# Patient Record
Sex: FEMALE | Race: WHITE | NOT HISPANIC OR LATINO | URBAN - METROPOLITAN AREA
[De-identification: names, ages, dates, MRNs, and addresses within clinical notes are randomized per-mention and may not be internally consistent; named-entity substitution may affect disease eponyms.]

---

## 2023-11-08 ENCOUNTER — APPOINTMENT (OUTPATIENT)
Age: 78
Setting detail: DERMATOLOGY
End: 2023-11-10

## 2023-11-08 VITALS
DIASTOLIC BLOOD PRESSURE: 108 MMHG | WEIGHT: 172 LBS | TEMPERATURE: 98.3 F | RESPIRATION RATE: 18 BRPM | HEART RATE: 72 BPM | HEIGHT: 60 IN | SYSTOLIC BLOOD PRESSURE: 218 MMHG

## 2023-11-08 VITALS — HEART RATE: 76 BPM | SYSTOLIC BLOOD PRESSURE: 212 MMHG | DIASTOLIC BLOOD PRESSURE: 100 MMHG | RESPIRATION RATE: 18 BRPM

## 2023-11-08 DIAGNOSIS — L82.1 OTHER SEBORRHEIC KERATOSIS: ICD-10-CM

## 2023-11-08 DIAGNOSIS — Z12.83 ENCOUNTER FOR SCREENING FOR MALIGNANT NEOPLASM OF SKIN: ICD-10-CM

## 2023-11-08 DIAGNOSIS — D18.0 HEMANGIOMA: ICD-10-CM

## 2023-11-08 DIAGNOSIS — L81.4 OTHER MELANIN HYPERPIGMENTATION: ICD-10-CM

## 2023-11-08 DIAGNOSIS — L57.8 OTHER SKIN CHANGES DUE TO CHRONIC EXPOSURE TO NONIONIZING RADIATION: ICD-10-CM

## 2023-11-08 PROBLEM — D03.0 MELANOMA IN SITU OF LIP: Status: ACTIVE | Noted: 2023-11-08

## 2023-11-08 PROBLEM — D18.01 HEMANGIOMA OF SKIN AND SUBCUTANEOUS TISSUE: Status: ACTIVE | Noted: 2023-11-08

## 2023-11-08 PROCEDURE — OTHER DECISION TO PERFORM MAJOR SURGERY: OTHER

## 2023-11-08 PROCEDURE — 99204 OFFICE O/P NEW MOD 45 MIN: CPT | Mod: 57

## 2023-11-08 PROCEDURE — OTHER COUNSELING: OTHER

## 2023-11-08 PROCEDURE — OTHER ADDITIONAL NOTES: OTHER

## 2023-11-08 PROCEDURE — OTHER SEPARATE AND IDENTIFIABLE DOCUMENTATION: OTHER

## 2023-11-08 PROCEDURE — OTHER MOHS SURGERY: OTHER

## 2023-11-08 PROCEDURE — 17311 MOHS 1 STAGE H/N/HF/G: CPT

## 2023-11-08 PROCEDURE — 14061 TIS TRNFR E/N/E/L10.1-30SQCM: CPT

## 2023-11-08 PROCEDURE — OTHER SURGICAL DECISION MAKING: OTHER

## 2023-11-08 PROCEDURE — OTHER ADDITIONAL PATHOLOGY SPECIMEN: OTHER

## 2023-11-08 PROCEDURE — OTHER ASC CONSULTATION: OTHER

## 2023-11-08 PROCEDURE — OTHER MIPS QUALITY: OTHER

## 2023-11-08 PROCEDURE — OTHER MELANOMA STAGING: OTHER

## 2023-11-08 PROCEDURE — 88342 IMHCHEM/IMCYTCHM 1ST ANTB: CPT | Mod: 59

## 2023-11-08 PROCEDURE — OTHER MIPS CLAIM BASED REPORTING: OTHER

## 2023-11-08 PROCEDURE — 7010F PT INFO INTO RECALL SYSTEM: CPT

## 2023-11-08 ASSESSMENT — LOCATION DETAILED DESCRIPTION DERM
LOCATION DETAILED: SUPERIOR THORACIC SPINE
LOCATION DETAILED: LEFT LOWER CUTANEOUS LIP
LOCATION DETAILED: RIGHT POSTERIOR SHOULDER
LOCATION DETAILED: EPIGASTRIC SKIN
LOCATION DETAILED: STERNAL NOTCH
LOCATION DETAILED: LEFT POSTERIOR SHOULDER

## 2023-11-08 ASSESSMENT — LOCATION SIMPLE DESCRIPTION DERM
LOCATION SIMPLE: RIGHT SHOULDER
LOCATION SIMPLE: UPPER BACK
LOCATION SIMPLE: CHEST
LOCATION SIMPLE: LEFT LIP
LOCATION SIMPLE: LEFT SHOULDER
LOCATION SIMPLE: ABDOMEN

## 2023-11-08 ASSESSMENT — LOCATION ZONE DERM
LOCATION ZONE: LIP
LOCATION ZONE: ARM
LOCATION ZONE: TRUNK

## 2023-11-08 NOTE — PROCEDURE: MOHS SURGERY
Length To Time In Minutes Device Was In Place: 10 Xeljanz Counseling: I discussed with the patient the risks of Xeljanz therapy including increased risk of infection, liver issues, headache, diarrhea, or cold symptoms. Live vaccines should be avoided. They were instructed to call if they have any problems.

## 2023-11-08 NOTE — PROCEDURE: MIPS QUALITY
Additional Notes: Patient was entered into an external recall system that includes a target date for the next full skin exam and a process to follow up with the patient if they do not follow up within the specified timeframe or if they miss there scheduled appointment.
Quality 130: Documentation Of Current Medications In The Medical Record: Current Medications Documented
Quality 397: Melanoma: Reporting: Pathology report includes the pT Category, thickness, ulceration and mitotic rate, peripheral and deep margin status and presence or absence of microsatellitosis for invasive tumors.
Detail Level: Detailed
Quality 138: Melanoma: Coordination Of Care: A treatment plan was communicated to the physicians providing continuing care within one month of diagnosis outlining: diagnosis, tumor thickness and a plan for surgery or alternate care.
Quality 226: Preventive Care And Screening: Tobacco Use: Screening And Cessation Intervention: Patient screened for tobacco use and is an ex/non-smoker
Quality 137: Melanoma: Continuity Of Care - Recall System: Documentation of system reason(s) for not entering patient's information into a recall system (eg, melanoma being monitored by another physician provider)

## 2023-11-08 NOTE — PROCEDURE: MOHS SURGERY
Medication(s) Requested: Ritalin 10mg #60  Last office visit: 10/10/18  Next office visit: 2/7/19  Last refill: 11/29/18  Is the patient due for refill of this medication(s): Yes  PDMP review: Criteria met. Forwarded to Physician/ORE[facility] for signature.      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

## 2023-11-08 NOTE — PROCEDURE: COUNSELING
Show Follow-Up Variable: Yes
Quality 138: Melanoma: Coordination Of Care: A treatment plan was communicated to the physicians providing continuing care within one month of diagnosis outlining: diagnosis, tumor thickness and a plan for surgery or alternate care.
When Should The Patient Follow-Up For Their Next Full-Body Skin Exam?: 1 Year
Quality 137: Melanoma: Continuity Of Care - Recall System: Documentation of system reason(s) for not entering patient's information into a recall system (eg, melanoma being monitored by another physician provider)
Detail Level: Detailed
Sunscreen Recommendations: Prescribed broad spectrum sunscreen spf 30+
Detail Level: Generalized

## 2023-11-08 NOTE — PROCEDURE: ADDITIONAL PATHOLOGY SPECIMEN
Body Location Override (Optional - Billing Will Still Be Based On Selected Body Map Location If Applicable): Left lower lip

## 2023-11-08 NOTE — PROCEDURE: DECISION TO PERFORM MAJOR SURGERY
Major Surgery (90 Day Global Surgeries Only) To Be Peformed Today Or Tomorrow: Mohs Surgery with Adjacent Tissue Transfer Repair
Usage Warning: This plan is only intended for use with surgical procedures which generate a 90 day global period.  These procedures are listed below.  Use of this plan for other procedures (Mohs surgery or excision with a different repair than listed below for example) is inappropriate and will increase your risk of failing an audit.
Detail Level: Simple
Date Of Surgery: today
Reason For Emergent Surgery: After examining the patient and evaluating the history and clinical presentation, the decision was made to perform major surgery.

## 2023-11-08 NOTE — PROCEDURE: MIPS CLAIM BASED REPORTING
Smoking Cessation Modifiers: Please select an option if indicated
Detail Level: Detailed
Recall: Patient information entered into a recall system that includes: target date for the next exam specified and a process to follow up with patients regarding missed or unscheduled appointments

## 2023-11-08 NOTE — PROCEDURE: MOHS SURGERY
Spoke with patient's wife (Selma)  regarding time of arrival for procedure that is scheduled on 5/23/17. Patient's wife verbalized instructions and confirmed procedure date and time of arrival on 5/23/17 at 7 AM.    Scc In Situ Histology Text: There was squamous cell atypia and proliferation in a SCIS pattern.

## 2023-11-08 NOTE — PROCEDURE: MELANOMA STAGING
Detail Level: Detailed
Counseling For Stage Iia Melanomas: I reviewed the factors which determine melanoma stage. For Stage IIA the 5-year survival rate is around 81%. The 10-year survival is around 67%. I recommended at least monthly skin self-examinations and close dermatology follow-up.
Counseling For Stage Iv Melanomas: I reviewed the factors which determine melanoma stage. For Stage IV the 5-year survival rate is around 15% to 20%. The 10-year survival is about 10% to 15%. I recommended at least monthly skin self-examinations and close dermatology follow-up. Systemic therapy option and rec oncologist referral reviewed.
Counseling For Stage Iiic Melanomas: I reviewed the factors which determine melanoma stage. For Stage IIIC the 5-year survival rate is around 40%. The 10-year survival is around 24%. I recommended at least monthly skin self-examinations and close dermatology follow-up. Systemic therapy option and rec oncologist referral reviewed.
Staging Type: initial staging
Who Was Counseled?: patient
Counseling For Stage Ia Melanomas: I reviewed the factors which determine melanoma stage. For Stage IA the 5-year survival rate is around 97%. The 10-year survival is around 95%. I recommended at least monthly skin self-examinations and close dermatology follow-up.
Counseling For Stage Iiib Melanomas: I reviewed the factors which determine melanoma stage. For Stage IIIB the 5-year survival rate is around 59%. The 10-year survival is around 43%. I recommended at least monthly skin self-examinations and close dermatology follow-up. Systemic therapy option and recommend oncologist referral reviewed.
Counseling For Stage 0 Melanomas: I reviewed the factors which determine melanoma stage. For Stage 0 the 5-year survival rate is 100%. I recommended at least monthly skin self-examinations and close dermatology follow-up.
Counseling For Stage Iiid Melanomas: I reviewed the factors which determine melanoma stage. For Stage IIID the 5-year survival rate is around 32%. The 10-year survival is around 24%. I recommended at least monthly skin self-examinations and close dermatology follow-up. Systemic therapy option and rec oncologist referral reviewed.
Mitotic Rate: Less than 1/mm2
Counseling For Stage Iib Melanomas: I reviewed the factors which determine melanoma stage. For Stage IIB the 5-year survival rate is around 70%. The 10-year survival is around 57%. I recommended at least monthly skin self-examinations and close dermatology follow-up.
Distant Metastases: No distant metastases
Breslow Depth In Mm (Leave At 0 For In Situ): 0
Counseling For Stage Iiia Melanomas: I reviewed the factors which determine melanoma stage. For Stage IIIA the 5-year survival rate is around 78%. The 10-year survival is around 68%. I recommended at least monthly skin self-examinations and close dermatology follow-up.  Systemic therapy option and oncologist referral reviewed.
Counseling For Stage Iic Melanomas: I reviewed the factors which determine melanoma stage. For Stage IIC the 5-year survival rate is around 53%. The 10-year survival is around 40%. I recommended at least monthly skin self-examinations and close dermatology follow-up.
Primary Tumor Identified?: Yes
Lymph Node Evaluation (Clinical And Microscopic): No clinically palpable lymph nodes and no microscopic lymph node metastases
Ulceration: No
Counseling For Stage Ib Melanomas: I reviewed the factors which determine melanoma stage. For Stage IB the 5-year survival rate is around 92%. The 10-year survival is around 86%. I recommended at least monthly skin self-examinations and close dermatology follow-up.

## 2023-11-08 NOTE — PROCEDURE: SURGICAL DECISION MAKING
Risk Assessment Explanation (Does Not Render In The Note): Clinical determination of the probability and/or consequences of an event, such as surgery. Clinical assessment of the level of risk is affected by the nature of the event under consideration for the patient. Modifier 57 is used to indicate an Evaluation and Management (E/M) service resulted in the initial decision to perform surgery either the day before a major surgery (90 day global) or the day of a major surgery.
Discussion: Decision for surgery refers to any surgeries performed today, or discussion of treatment in the future. In general, decision for repair is not made until the final extent of the surgical wound is known.
Complexity (Necessary For Coding; Major - 90 Day Global With Some Exceptions; Minor - 10 Day Global): major
Identified Risk Factors Documented?: yes
Date Of Surgery - Today Or Tomorrow?: today

## 2023-11-08 NOTE — PROCEDURE: ADDITIONAL NOTES
Detail Level: Simple
Render Risk Assessment In Note?: no
Additional Notes: Melanoma diagnosis and management has has inherent risk for additional primary melanoma, recurrence and/or metastasis.
Patient Management Risk Assessment: Moderate

## 2023-11-15 ENCOUNTER — APPOINTMENT (OUTPATIENT)
Dept: URBAN - METROPOLITAN AREA CLINIC 194 | Age: 78
Setting detail: DERMATOLOGY
End: 2023-11-17

## 2023-11-15 PROBLEM — Z48.01 ENCOUNTER FOR CHANGE OR REMOVAL OF SURGICAL WOUND DRESSING: Status: ACTIVE | Noted: 2023-11-15

## 2023-11-15 PROCEDURE — 99024 POSTOP FOLLOW-UP VISIT: CPT

## 2023-11-15 PROCEDURE — OTHER DRESSING CHANGE (GLOBAL PERIOD): OTHER

## 2023-11-15 NOTE — PROCEDURE: DRESSING CHANGE (GLOBAL PERIOD)
Add 62085 Cpt? (Important Note: In 2017 The Use Of 16676 Is Being Tracked By Cms To Determine Future Global Period Reimbursement For Global Periods): yes
Detail Level: Detailed

## 2023-12-20 ENCOUNTER — APPOINTMENT (OUTPATIENT)
Dept: URBAN - METROPOLITAN AREA CLINIC 194 | Age: 78
Setting detail: DERMATOLOGY
End: 2023-12-21

## 2023-12-20 VITALS
HEIGHT: 60 IN | DIASTOLIC BLOOD PRESSURE: 90 MMHG | TEMPERATURE: 98.6 F | SYSTOLIC BLOOD PRESSURE: 190 MMHG | HEART RATE: 62 BPM | RESPIRATION RATE: 18 BRPM | WEIGHT: 169 LBS

## 2023-12-20 DIAGNOSIS — Z48.817 ENCOUNTER FOR SURGICAL AFTERCARE FOLLOWING SURGERY ON THE SKIN AND SUBCUTANEOUS TISSUE: ICD-10-CM

## 2023-12-20 DIAGNOSIS — L57.8 OTHER SKIN CHANGES DUE TO CHRONIC EXPOSURE TO NONIONIZING RADIATION: ICD-10-CM

## 2023-12-20 DIAGNOSIS — Z86.006 PERSONAL HISTORY OF MELANOMA IN-SITU: ICD-10-CM

## 2023-12-20 DIAGNOSIS — Z12.83 ENCOUNTER FOR SCREENING FOR MALIGNANT NEOPLASM OF SKIN: ICD-10-CM

## 2023-12-20 DIAGNOSIS — L90.5 SCAR CONDITIONS AND FIBROSIS OF SKIN: ICD-10-CM

## 2023-12-20 PROCEDURE — OTHER MIPS QUALITY: OTHER

## 2023-12-20 PROCEDURE — 7010F PT INFO INTO RECALL SYSTEM: CPT

## 2023-12-20 PROCEDURE — OTHER SURGICAL DECISION MAKING: OTHER

## 2023-12-20 PROCEDURE — OTHER MIPS CLAIM BASED REPORTING: OTHER

## 2023-12-20 PROCEDURE — OTHER ADDITIONAL NOTES: OTHER

## 2023-12-20 PROCEDURE — 99214 OFFICE O/P EST MOD 30 MIN: CPT | Mod: 24

## 2023-12-20 PROCEDURE — OTHER COUNSELING: OTHER

## 2023-12-20 NOTE — PROCEDURE: COUNSELING
Quality 137: Melanoma: Continuity Of Care - Recall System: Documentation of system reason(s) for not entering patient's information into a recall system (eg, melanoma being monitored by another physician provider)
Detail Level: Detailed
When Should The Patient Follow-Up For Their Next Full-Body Skin Exam?: 3 Months

## 2023-12-20 NOTE — PROCEDURE: MIPS CLAIM BASED REPORTING
Detail Level: Detailed
Smoking Cessation Modifiers: Please select an option if indicated
Recall: Patient information entered into a recall system that includes: target date for the next exam specified and a process to follow up with patients regarding missed or unscheduled appointments

## 2023-12-20 NOTE — PROCEDURE: MIPS QUALITY
Quality 138: Melanoma: Coordination Of Care: A treatment plan was communicated to the physicians providing continuing care within one month of diagnosis outlining: diagnosis, tumor thickness and a plan for surgery or alternate care.
Quality 137: Melanoma: Continuity Of Care - Recall System: Documentation of system reason(s) for not entering patient's information into a recall system (eg, melanoma being monitored by another physician provider)
Quality 130: Documentation Of Current Medications In The Medical Record: Current Medications Documented
Quality 226: Preventive Care And Screening: Tobacco Use: Screening And Cessation Intervention: Patient screened for tobacco use and is an ex/non-smoker
Detail Level: Detailed

## 2023-12-20 NOTE — PROCEDURE: SURGICAL DECISION MAKING
Risk Assessment Explanation (Does Not Render In The Note): Clinical determination of the probability and/or consequences of an event, such as surgery. Clinical assessment of the level of risk is affected by the nature of the event under consideration for the patient. Modifier 57 is used to indicate an Evaluation and Management (E/M) service resulted in the initial decision to perform surgery either the day before a major surgery (90 day global) or the day of a major surgery.
Identified Risk Factors Documented?: yes
Date Of Surgery - Today Or Tomorrow?: today
Complexity (Necessary For Coding; Major - 90 Day Global With Some Exceptions; Minor - 10 Day Global): minor
Discussion: Decision for surgery refers to any surgeries performed today, or discussion of treatment in future.  In general, decision for repair is not made until the final extent of the surgical wound is known.

## 2024-05-20 NOTE — PROCEDURE: ADDITIONAL PATHOLOGY SPECIMEN
85 Path Notes (To The Dermatopathologist): Debulking prior to mohs surgery (visible pigment) Attn Billing add modifier XP.
